# Patient Record
Sex: FEMALE | Race: WHITE
[De-identification: names, ages, dates, MRNs, and addresses within clinical notes are randomized per-mention and may not be internally consistent; named-entity substitution may affect disease eponyms.]

---

## 2020-04-02 ENCOUNTER — HOSPITAL ENCOUNTER (EMERGENCY)
Dept: HOSPITAL 97 - ER | Age: 40
Discharge: HOME | End: 2020-04-02
Payer: SELF-PAY

## 2020-04-02 VITALS — DIASTOLIC BLOOD PRESSURE: 72 MMHG | SYSTOLIC BLOOD PRESSURE: 101 MMHG | TEMPERATURE: 97.1 F | OXYGEN SATURATION: 100 %

## 2020-04-02 DIAGNOSIS — M62.838: Primary | ICD-10-CM

## 2020-04-02 PROCEDURE — 99283 EMERGENCY DEPT VISIT LOW MDM: CPT

## 2020-04-02 NOTE — RAD REPORT
EXAM DESCRIPTION:  RAD - Forearm Right - 4/2/2020 1:27 pm

 

CLINICAL HISTORY:  PAIN

 

COMPARISON:  No comparisons

 

FINDINGS:  No bone or joint abnormality is detected.

## 2020-04-02 NOTE — ER
Nurse's Notes                                                                                     

 [facility]                                                                 

Name: Geneva Nails                                                                                 

Age: 39 yrs                                                                                       

Sex: Female                                                                                       

: 1980                                                                                   

MRN: [id number]                                                                                   

Arrival Date: 2020                                                                          

Time: 11:42                                                                                       

Account#: [id number]                                                                            

Bed 15                                                                                            

Private MD:                                                                                       

Diagnosis: Muscle spasm                                                                           

                                                                                                  

Presentation:                                                                                     

                                                                                             

11:54 Chief complaint: Patient states: Right forearm numbness x 5 days; states "When I        jl7 

      stretch my arm out all the way it shoots a sharp pain all the way to right bicep."          

      Denies trauma, Denies any other symptoms. Coronavirus screen: Patient denies fever          

      greater than 100.4F, cough, shortness of breath, or difficulty breathing. Proceed with      

      normal triage process. Ebola Screen: No symptoms or risks identified at this time.          

      Initial Sepsis Screen: Does the patient meet any 2 criteria? No. Patient's initial          

      sepsis screen is negative. Does the patient have a suspected source of infection? No.       

      Patient's initial sepsis screen is negative. Risk Assessment: Do you want to hurt           

      yourself or someone else? Patient reports no desire to harm self or others. Onset of        

      symptoms was 2020.                                                                

11:54 Method Of Arrival: Ambulatory                                                           7 

11:54 Acuity: SISSY 4                                                                           jl7 

                                                                                                  

Triage Assessment:                                                                                

11:58 General: Appears in no apparent distress. uncomfortable, Behavior is calm, cooperative, jl7 

      appropriate for age. Pain: Denies pain.                                                     

                                                                                                  

OB/GYN:                                                                                           

11:58 LMP 3/27/2020                                                                           jl7 

                                                                                                  

Historical:                                                                                       

- Allergies:                                                                                      

11:58 No Known Allergies;                                                                     jl7 

- Home Meds:                                                                                      

11:58 None [Active];                                                                          jl7 

- PMHx:                                                                                           

11:58 None;                                                                                   jl7 

- PSHx:                                                                                           

11:58 None;                                                                                   jl7 

                                                                                                  

- Immunization history:: Adult Immunizations up to date.                                          

- Social history:: Smoking status: Patient denies any tobacco usage or history of.                

  Patient/guardian denies using alcohol, street drugs, The patient lives with family, .           

- Family history:: not pertinent.                                                                 

                                                                                                  

                                                                                                  

Screenin:06 Abuse screen: Denies threats or abuse. Nutritional screening: No deficits noted.        ll1 

      Tuberculosis screening: No symptoms or risk factors identified. Fall Risk None              

      identified. Total Tam Fall Scale indicates No Risk (0-24 pts).                            

                                                                                                  

Assessment:                                                                                       

14:04 General: Appears in no apparent distress. Behavior is calm, cooperative. Pain: Denies   ll1 

      pain. Neuro: No deficits noted. Cardiovascular: No deficits noted. Respiratory: No          

      deficits noted. Musculoskeletal: Circulation, motion, and sensation intact. Capillary       

      refill < 3 seconds, Range of motion: intact in all extremities, Reports pain in right       

      forearm.                                                                                    

                                                                                                  

Vital Signs:                                                                                      

11:54  / 72; Pulse 65; Resp 16 S; Temp 97.1(O); Pulse Ox 100% on R/A; Weight 68.04 kg   jl7 

      (R); Pain 0/10;                                                                             

                                                                                                  

ED Course:                                                                                        

11:42 Patient arrived in ED.                                                                  am2 

11:52 Richie Bosch MD is Attending Physician.                                           ma2 

11:57 Triage completed.                                                                       jl7 

11:58 Arm band placed on right wrist.                                                         jl7 

11:59 Patient placed in waiting room, in view of staff members, Patient notified of wait time.jl7 

13:07 Elida Corona, RN is Primary Nurse.                                                     ll1 

13:27 X-ray completed. Portable x-ray completed in exam room. Patient tolerated procedure     jb2 

      well.                                                                                       

13:27 Forearm Right XRAY In Process Unspecified.                                              EDMS

14:07 Patient has correct armband on for positive identification. Bed in low position. Call   ll1 

      light in reach.                                                                             

14:09 No provider procedures requiring assistance completed. Patient did not have IV access   ll1 

      during this emergency room visit.                                                           

                                                                                                  

Administered Medications:                                                                         

No medications were administered                                                                  

                                                                                                  

                                                                                                  

Outcome:                                                                                          

13:53 Discharge ordered by MD.                                                                ma2 

14:08 Discharged to home ambulatory.                                                          ll1 

14:08 Condition: stable                                                                           

14:08 Discharge instructions given to patient, Instructed on discharge instructions, follow       

      up and referral plans. Demonstrated understanding of instructions, follow-up care,          

      splint care.                                                                                

14:10 Patient left the ED.                                                                    ll1 

                                                                                                  

Signatures:                                                                                       

Dispatcher [employer]                           EDMS                                                 

Urbano Quintero                              jb2                                                  

Jp Childers RN                        RN   jl7                                                  

Lucy Harris                               am2                                                  

Richie Bosch MD MD ma2 Lewis, Lynsay, JESSEE                       RN   ll1                                                  

                                                                                                  

**************************************************************************************************

## 2020-04-02 NOTE — EDPHYS
Physician Documentation                                                                           

 [facility]                                                                 

Name: Geneva Nails                                                                                 

Age: 39 yrs                                                                                       

Sex: Female                                                                                       

: 1980                                                                                   

MRN: [id number]                                                                                   

Arrival Date: 2020                                                                          

Time: 11:42                                                                                       

Account#: [id number]                                                                            

Bed 15                                                                                            

Private MD:                                                                                       

ED Physician Richie Bosch                                                                    

HPI:                                                                                              

                                                                                             

13:51 This 39 yrs old  Female presents to ER via Ambulatory with complaints of       ma2 

      Numbness Of Arm.                                                                            

13:51 The patient or guardian complains of pain, that is acute. Onset: The symptoms/episode   ma2 

      began/occurred gradually, 2 day(s) ago. Associated signs and symptoms: Pertinent            

      negatives: erythema, numbness, tingling, warmth. Severity of symptoms: At their worst       

      the symptoms were mild, in the emergency department the symptoms are unchanged. The         

      patient has not experienced similar symptoms in the past.                                   

                                                                                                  

OB/GYN:                                                                                           

11:58 LMP 3/27/2020                                                                           jl7 

                                                                                                  

Historical:                                                                                       

- Allergies:                                                                                      

11:58 No Known Allergies;                                                                     jl7 

- Home Meds:                                                                                      

11:58 None [Active];                                                                          jl7 

- PMHx:                                                                                           

11:58 None;                                                                                   jl7 

- PSHx:                                                                                           

11:58 None;                                                                                   jl7 

                                                                                                  

- Immunization history:: Adult Immunizations up to date.                                          

- Social history:: Smoking status: Patient denies any tobacco usage or history of.                

  Patient/guardian denies using alcohol, street drugs, The patient lives with family, .           

- Family history:: not pertinent.                                                                 

                                                                                                  

                                                                                                  

ROS:                                                                                              

13:51 Constitutional: Negative for fever, chills, and weight loss.                            ma2 

13:51 All other systems are negative.                                                             

                                                                                                  

Exam:                                                                                             

13:51 Constitutional:  This is a well developed, well nourished patient who is awake, alert,  ma2 

      and in no acute distress. Cardiovascular:  Regular rate and rhythm with a normal S1 and     

      S2.  No gallops, murmurs, or rubs.  Normal PMI, no JVD.  No pulse deficits.                 

      Respiratory:  Lungs have equal breath sounds bilaterally, clear to auscultation and         

      percussion.  No rales, rhonchi or wheezes noted.  No increased work of breathing, no        

      retractions or nasal flaring. Abdomen/GI:  Soft, non-tender, with normal bowel sounds.      

      No distension or tympany.  No guarding or rebound.  No evidence of tenderness               

      throughout. Back:  No spinal tenderness.  No costovertebral tenderness.  Full range of      

      motion. Skin:  Warm, dry with normal turgor.  Normal color with no rashes, no lesions,      

      and no evidence of cellulitis. MS/ Extremity:  Pulses equal, no cyanosis.                   

      Neurovascular intact.  Full, normal range of motion. Neuro:  Awake and alert, GCS 15,       

      oriented to person, place, time, and situation.  Cranial nerves II-XII grossly intact.      

      Motor strength 5/5 in all extremities.  Sensory grossly intact.  Cerebellar exam            

      normal.  Normal gait.                                                                       

                                                                                                  

Vital Signs:                                                                                      

11:54  / 72; Pulse 65; Resp 16 S; Temp 97.1(O); Pulse Ox 100% on R/A; Weight 68.04 kg   jl7 

      (R); Pain 0/10;                                                                             

                                                                                                  

MDM:                                                                                              

12:49 Patient medically screened.                                                             ma2 

13:51 Differential diagnosis: dislocation, closed fracture, contusion, abrasion, tendonitis.  ma2 

      Data reviewed: vital signs, nurses notes. Counseling: I had a detailed discussion with      

      the patient and/or guardian regarding: the historical points, exam findings, and any        

      diagnostic results supporting the discharge/admit diagnosis, the presence of at least       

      one elevated blood pressure reading (>120/80) during this emergency department visit,       

      the need for outpatient follow up. Response to treatment: she does not want pain rx.        

                                                                                                  

                                                                                             

13:08 Order name: Forearm Right XRAY; Complete Time: 13:49                                    ma2 

                                                                                             

13:08 Order name: Sling; Complete Time: 14:03                                                 ma2 

                                                                                                  

Administered Medications:                                                                         

No medications were administered                                                                  

                                                                                                  

                                                                                                  

Disposition:                                                                                      

20 13:53 Discharged to Home. Impression: Muscle spasm.                                      

- Condition is Stable.                                                                            

- Discharge Instructions: Muscle Pain, Adult.                                                     

                                                                                                  

- Medication Reconciliation Form, Thank You Letter, Antibiotic Education, Prescription            

  Opioid Use form.                                                                                

- Follow up: Private Physician; When: Tomorrow; Reason: Continuance of care.                      

                                                                                                  

                                                                                                  

                                                                                                  

Signatures:                                                                                       

Dispatcher [employer]                           EDJp Olsen RN                        RN   jl7                                                  

Richie Bosch MD MD   ma2                                                  

Elida Corona RN                       RN   ll1                                                  

                                                                                                  

Corrections: (The following items were deleted from the chart)                                    

14:10 13:53 2020 13:53 Discharged to Home. Impression: Muscle spasm. Condition is       ll1 

      Stable. Forms are Medication Reconciliation Form, Thank You Letter, Antibiotic              

      Education, Prescription Opioid Use. Follow up: Private Physician; When: Tomorrow;           

      Reason: Continuance of care. ma2                                                            

                                                                                                  

**************************************************************************************************